# Patient Record
Sex: FEMALE | Race: WHITE | Employment: OTHER | URBAN - METROPOLITAN AREA
[De-identification: names, ages, dates, MRNs, and addresses within clinical notes are randomized per-mention and may not be internally consistent; named-entity substitution may affect disease eponyms.]

---

## 2018-06-26 ENCOUNTER — HOSPITAL ENCOUNTER (EMERGENCY)
Facility: CLINIC | Age: 43
Discharge: HOME | End: 2018-06-26
Attending: EMERGENCY MEDICINE | Admitting: EMERGENCY MEDICINE
Payer: COMMERCIAL

## 2018-06-26 VITALS
SYSTOLIC BLOOD PRESSURE: 128 MMHG | BODY MASS INDEX: 36.1 KG/M2 | TEMPERATURE: 98 F | OXYGEN SATURATION: 99 % | WEIGHT: 230 LBS | HEART RATE: 78 BPM | DIASTOLIC BLOOD PRESSURE: 82 MMHG | HEIGHT: 67 IN | RESPIRATION RATE: 16 BRPM

## 2018-06-26 DIAGNOSIS — H81.399 PERIPHERAL VERTIGO, UNSPECIFIED LATERALITY: ICD-10-CM

## 2018-06-26 PROCEDURE — 93005 ELECTROCARDIOGRAM TRACING: CPT

## 2018-06-26 PROCEDURE — 99283 EMERGENCY DEPT VISIT LOW MDM: CPT

## 2018-06-26 PROCEDURE — 25000131 ZZH RX MED GY IP 250 OP 636 PS 637: Performed by: NURSE PRACTITIONER

## 2018-06-26 RX ORDER — MECLIZINE HYDROCHLORIDE 25 MG/1
25 TABLET ORAL ONCE
Status: COMPLETED | OUTPATIENT
Start: 2018-06-26 | End: 2018-06-26

## 2018-06-26 RX ORDER — METHYLPREDNISOLONE 4 MG
TABLET, DOSE PACK ORAL
Qty: 21 TABLET | Refills: 0 | Status: SHIPPED | OUTPATIENT
Start: 2018-06-26 | End: 2020-03-10

## 2018-06-26 RX ORDER — OXYMETAZOLINE HYDROCHLORIDE 0.05 G/100ML
2 SPRAY NASAL 2 TIMES DAILY
Qty: 1 BOTTLE | Refills: 0 | Status: SHIPPED | OUTPATIENT
Start: 2018-06-26 | End: 2018-06-29

## 2018-06-26 RX ADMIN — MECLIZINE HYDROCHLORIDE 25 MG: 25 TABLET ORAL at 17:45

## 2018-06-26 ASSESSMENT — ENCOUNTER SYMPTOMS
FATIGUE: 1
FEVER: 0
HEADACHES: 1
PALPITATIONS: 0
COUGH: 1
SHORTNESS OF BREATH: 0
DIZZINESS: 1

## 2018-06-26 NOTE — ED AVS SNAPSHOT
[facility] Emergency Department    [address]    [facility] [address]    Phone:  [phone]    Fax:  [phone]                                       Yolanda Gilmore   MRN: [id number]    Department:  [facility] Emergency Department   Date of Visit:  6/26/2018           After Visit Summary Signature Page     I have received my discharge instructions, and my questions have been answered. I have discussed any challenges I see with this plan with the nurse or doctor.    ..........................................................................................................................................  Patient/Patient Representative Signature      ..........................................................................................................................................  Patient Representative Print Name and Relationship to Patient    ..................................................               ................................................  Date                                            Time    ..........................................................................................................................................  Reviewed by Signature/Title    ...................................................              ..............................................  Date                                                            Time

## 2018-06-26 NOTE — ED TRIAGE NOTES
Pt presents with episodes of dizziness since Friday. Onset of room spinning, followed by tingling in body and nausea. Episodes last a couple hours. Has had daily episode since Friday. Also complains of sinus congestion, and itchy ears. Reports onset of HA last night. Pain level  4/10.

## 2018-06-26 NOTE — ED PROVIDER NOTES
History     Chief Complaint:  Dizziness    The history is provided by the patient.      Yolanda Gilmore is a 42 year old female who presents for evaluation of dizziness. The patient reports onset of room-spinning dizziness 4 days ago which comes and goes in episodes with associated nausea. These episodes last 1-2 hours, are exacerbated with movement such as standing up or walking around, and are somewhat alleviated by sitting down and concentrating on her breathing. Patient additionally notes onset of fatigue and mild cough in parallel with onset of her dizziness. Patient has no history of this. The patient does endorse small episodes of dizziness after standing up quickly in the last few weeks, lasting approximately 5-10 seconds.    Of note, the patient also is experiencing frontal and maxillary sinus pressure, nasal congestion and mild, non-productive cough. The patient states that she typically wears glasses and stopped wearing them in the las 2-3 weeks. Patient takes prescribed medication for anxiety, depression and hypertension. She states she occasionally forgets to take these medications.  She did take all prescribed medications the last three days.  Last dose of any medication was Excedrin last night. The patient declines offer of pain medication. Patient denies posterior headaches, fevers, shortness of breath, chest pain, palpitations, or other complaint.     Allergies:  No known drug allergies     Medications:    Bupropion  Hydrochlorothiazide    Past Medical History:    Major depression  Tobacco user  Obesity  Chemical dependence  Sleep apnea  Kidney stone    Past Surgical History:    History reviewed. No pertinent surgical history.     Family History:    Alcoholism  CVD  CAD  Diabetes  Ovarian cancer    Social History:  Presents alone   Tobacco use: current every day somker  Alcohol use: yes (socially)  PCP: Physician No Ref-Primary    Marital Status:  Single     Review of Systems   Constitutional:  "Positive for fatigue. Negative for fever.   Respiratory: Positive for cough. Negative for shortness of breath.    Cardiovascular: Negative for chest pain and palpitations.   Neurological: Positive for dizziness and headaches.     Physical Exam     Patient Vitals for the past 24 hrs:   BP Temp Temp src Heart Rate Resp SpO2 Height Weight   06/26/18 1713 (!) 130/97 98  F (36.7  C) Oral 67 20 100 % 1.702 m (5' 7\") 104.3 kg (230 lb)      Physical Exam  Constitutional: Alert, attentive, GCS 15.    HENT: Mucous membranes are moist. Ears: TMs gray, translucent, with serous effusion. External canals normal. Pain with tapping over right maxillary and frontal sinuses.   Eyes: EOM are normal. PERRLA. Conjunctiva pink, no scleral icterus or conjunctival injection  CV: regular rate and rhythm; no murmurs, rubs or gallups.  Radial, dorsalis pedis and posterior tibial pulses 2+ bilaterally.  Cap refill <2 seconds.  Respiratory: Effort normal. Lungs clear to auscultation bilaterally. No crackles/rubs/wheezes.  Good air movement.  GI:  There is no tenderness; rebound or guarding. No distension. Normal bowel sounds.  MSK: Normal range of motion. No peripheral edema or calf tenderness.  Neurological:Alert and oriented to person/place/time.  Intact recent and remote memory, judgment and insight, normal mood and affect. no aphasia/facial droop/dysarthria, tongue midline, symmetric palatal elevation, normal strength at SCM/trapezius/BUE/BLE, normal coordination to FNF at BUE, no pronator drift, sensation intact to LT over face/BUE/BLE, normal causal gait.  Skin: Skin is warm and dry.  No rashes or petechiae.  Psychiatric: Normal affect.    Emergency Department Course   ECG (17:13:58):  Rate 65 bpm. RI interval 118. QRS duration 94. QT/QTc 408/424. P-R-T axes 61 64 62. Christine sinus rhythm. Possible left atrial enlargement. Borderline ECG. Agree with computer interpretation. No significant change when compared to EKG dated 04/20/2013.  " Interpreted at 1806 by Loreta Trevizo MD.     Interventions:  1745: Meclizine 25 mg PO      Emergency Department Course:  Past medical records, nursing notes, and vitals reviewed.  1710: I performed an exam of the patient and obtained history, as documented above.    Above interventions provided.  EKG was taken here in the ED, results as above.    1835: I rechecked the patient. Findings and plan explained to the Patient. Patient discharged home with instructions regarding supportive care, medications, and reasons to return. The importance of close follow-up was reviewed.      Impression & Plan      Medical Decision Making:  Yolanda Gilmore is a 42 year old female who presents for evaluation of vertigo. She received dose of meclizine here without recurrence of vertigo. The differential diagnosis of vertigo is broad and includes common etiologies such as menieres disease, labyrinthitis, benign positional vertigo, otitis media, etc.  More serious etiologies considered include central etiologies such as tumor, intracerebral bleed, dissection, ischemic cerebral vascular accident.  The history, physical exam including detailed neurologic exam, and workup in the emergency room suggests a benign cause of vertigo today. No indication for advanced imaging at this point (CT/MRI) as there are no definite signs of a central and concerning etiology for the vertigo.   I suspect patient's vertigo related to vestibular neuritis.  She will be sent home with prescription for Medrol dose pack.   She was also given prescription for Afrin to help decrease congestion.  Further outpatient management is indicated with vertigo medications.  Discussed expected course of illness.  Patient will follow-up with ENT if not signficantly improved in one week.  Vertigo precautions given for home.  Return with worsening symptoms including headache, one-sided weakness, difficulty speaking, fevers, or any further concerns.     Diagnosis:     ICD-10-CM    1. Peripheral vertigo, unspecified laterality H81.399        Disposition:  Discharged to home with plan as outlined.     Discharge Medications:  Discharge Medication List as of 6/26/2018  6:46 PM      START taking these medications    Details   methylPREDNISolone (MEDROL DOSEPAK) 4 MG tablet Follow package instructions, Disp-21 tablet, R-0, Local Print      oxymetazoline (AFRIN NASAL SPRAY) 0.05 % spray Spray 2 sprays in nostril 2 times daily for 3 days, Disp-1 Bottle, R-0, Local PrintNo drip 12 hour formula please               YSABEL, Fabiano Hernandez, daquan serving as a scribe at 5:22 PM on 6/26/2018 to document services personally performed by Loreta Trevizo MD based on my observations and the provider's statements to me.    6/26/2018   [facility] EMERGENCY DEPARTMENT     Bri Mccullough, RUBINA CNP  06/26/18 7875

## 2018-06-26 NOTE — ED AVS SNAPSHOT
[facility] Emergency Department    [address]    [facility] [address]    Phone:  [phone]    Fax:  [phone]                                       Yolanda Gilmore   MRN: [id number]    Department:  [facility] Emergency Department   Date of Visit:  6/26/2018           Patient Information     Date Of Birth          1975        Your diagnoses for this visit were:     Peripheral vertigo, unspecified laterality        You were seen by Loreta Trevizo MD.      Follow-up Information     Follow up with [facility] In 1 week.    Why:  As needed    Contact information:    [address] [facility] [phone]        Follow up with [facility] Emergency Department.    Specialty:  EMERGENCY MEDICINE    Why:  If symptoms worsen    Contact information:    [address]  [facility] [address]  [phone]      Discharge References/Attachments     VERTIGO, UNSPECIFIED (ENGLISH)      24 Hour Appointment Hotline       To make an appointment at any [facility] clinic, call [phone] ([phone]). If you don't have a family doctor or clinic, we will help you find one. [facility] clinics are conveniently located to serve the needs of you and your family.             Review of your medicines      START taking        Dose / Directions Last dose taken    methylPREDNISolone 4 MG tablet   Commonly known as:  MEDROL DOSEPAK   Quantity:  21 tablet        Follow package instructions   Refills:  0        oxymetazoline 0.05 % spray   Commonly known as:  AFRIN NASAL SPRAY   Dose:  2 spray   Quantity:  1 Bottle        Spray 2 sprays in nostril 2 times daily for 3 days   Refills:  0          Our records show that you are taking the medicines listed below. If these are incorrect, please call your family doctor or clinic.        Dose / Directions Last dose taken    BUPROPION HCL PO        Refills:  0        HYDROCHLOROTHIAZIDE  PO        Refills:  0                Prescriptions were sent or printed at these locations (2 Prescriptions)                   Other Prescriptions                Printed at Department/Unit printer (2 of 2)         methylPREDNISolone (MEDROL DOSEPAK) 4 MG tablet               oxymetazoline (AFRIN NASAL SPRAY) 0.05 % spray                Procedures and tests performed during your visit     EKG 12 lead      Orders Needing Specimen Collection     None      Pending Results     Date and Time Order Name Status Description    6/26/2018 1709 EKG 12 lead Preliminary             Pending Culture Results     No orders found from 6/24/2018 to 6/27/2018.            Pending Results Instructions     If you had any lab results that were not finalized at the time of your Discharge, you can call the ED Lab Result RN at [phone]. You will be contacted by this team for any positive Lab results or changes in treatment. The nurses are available 7 days a week from 10A to 6:30P.  You can leave a message 24 hours per day and they will return your call.        Test Results From Your Hospital Stay               Clinical Quality Measure: Blood Pressure Screening     Your blood pressure was checked while you were in the emergency department today. The last reading we obtained was  BP: 122/87 . Please read the guidelines below about what these numbers mean and what you should do about them.  If your systolic blood pressure (the top number) is less than 120 and your diastolic blood pressure (the bottom number) is less than 80, then your blood pressure is normal. There is nothing more that you need to do about it.  If your systolic blood pressure (the top number) is 120-139 or your diastolic blood pressure (the bottom number) is 80-89, your blood pressure may be higher than it should be. You should have your blood pressure rechecked within a year by a primary care provider.  If your systolic blood pressure (the top number) is 140 or greater or  "your diastolic blood pressure (the bottom number) is 90 or greater, you may have high blood pressure. High blood pressure is treatable, but if left untreated over time it can put you at risk for heart attack, stroke, or kidney failure. You should have your blood pressure rechecked by a primary care provider within the next 4 weeks.  If your provider in the emergency department today gave you specific instructions to follow-up with your doctor or provider even sooner than that, you should follow that instruction and not wait for up to 4 weeks for your follow-up visit.        Thank you for choosing [facility]       Thank you for choosing [facility] for your care. Our goal is always to provide you with excellent care. Hearing back from our patients is one way we can continue to improve our services. Please take a few minutes to complete the written survey that you may receive in the mail after you visit with us. Thank you!        [employer] Information     [employer] lets you send messages to your doctor, view your test results, renew your prescriptions, schedule appointments and more. To sign up, go to [email or web address] . Click on \"Log in\" on the left side of the screen, which will take you to the Welcome page. Then click on \"Sign up Now\" on the right side of the page.     You will be asked to enter the access code listed below, as well as some personal information. Please follow the directions to create your username and password.     Your access code is: [email or web address]  Expires: 2018  6:46 PM     Your access code will  in 90 days. If you need help or a new code, please call your [facility] clinic or [phone].        Care EveryWhere ID     This is your Care EveryWhere ID. This could be used by other organizations to access your [facility] medical records  [id number]        Equal Access to Services     ROBBIN KRUGER AH: perfecto Floyd, conner cole " margarita torres ah. So [facility] [phone].    ATENCIÓN: Si habla español, tiene a perez disposición servicios gratuitos de asistencia lingüística. Llame al [phone].    We comply with applicable federal civil rights laws and [place] laws. We do not discriminate on the basis of race, color, national origin, age, disability, sex, sexual orientation, or gender identity.            After Visit Summary       This is your record. Keep this with you and show to your community pharmacist(s) and doctor(s) at your next visit.

## 2018-06-27 LAB — INTERPRETATION ECG - MUSE: NORMAL

## 2020-01-26 ENCOUNTER — OFFICE VISIT (OUTPATIENT)
Dept: URGENT CARE | Facility: URGENT CARE | Age: 45
End: 2020-01-26
Payer: COMMERCIAL

## 2020-01-26 VITALS
DIASTOLIC BLOOD PRESSURE: 90 MMHG | WEIGHT: 217 LBS | TEMPERATURE: 98.9 F | BODY MASS INDEX: 32.89 KG/M2 | HEIGHT: 68 IN | HEART RATE: 80 BPM | SYSTOLIC BLOOD PRESSURE: 126 MMHG | RESPIRATION RATE: 12 BRPM

## 2020-01-26 DIAGNOSIS — B96.89 BV (BACTERIAL VAGINOSIS): ICD-10-CM

## 2020-01-26 DIAGNOSIS — N89.8 VAGINAL DISCHARGE: ICD-10-CM

## 2020-01-26 DIAGNOSIS — N76.0 BV (BACTERIAL VAGINOSIS): ICD-10-CM

## 2020-01-26 DIAGNOSIS — N39.0 URINARY TRACT INFECTION WITHOUT HEMATURIA, SITE UNSPECIFIED: Primary | ICD-10-CM

## 2020-01-26 DIAGNOSIS — R30.0 DYSURIA: ICD-10-CM

## 2020-01-26 LAB
ALBUMIN UR-MCNC: ABNORMAL MG/DL
APPEARANCE UR: CLEAR
BILIRUB UR QL STRIP: NEGATIVE
COLOR UR AUTO: YELLOW
GLUCOSE UR STRIP-MCNC: NEGATIVE MG/DL
HGB UR QL STRIP: ABNORMAL
KETONES UR STRIP-MCNC: NEGATIVE MG/DL
LEUKOCYTE ESTERASE UR QL STRIP: ABNORMAL
NITRATE UR QL: NEGATIVE
PH UR STRIP: 7 PH (ref 5–7)
RBC #/AREA URNS AUTO: ABNORMAL /HPF
SOURCE: ABNORMAL
SP GR UR STRIP: 1.02 (ref 1–1.03)
SPECIMEN SOURCE: ABNORMAL
UROBILINOGEN UR STRIP-ACNC: 0.2 EU/DL (ref 0.2–1)
WBC #/AREA URNS AUTO: ABNORMAL /HPF
WET PREP SPEC: ABNORMAL

## 2020-01-26 PROCEDURE — 87210 SMEAR WET MOUNT SALINE/INK: CPT | Performed by: FAMILY MEDICINE

## 2020-01-26 PROCEDURE — 81001 URINALYSIS AUTO W/SCOPE: CPT | Performed by: FAMILY MEDICINE

## 2020-01-26 PROCEDURE — 87591 N.GONORRHOEAE DNA AMP PROB: CPT | Performed by: FAMILY MEDICINE

## 2020-01-26 PROCEDURE — 99203 OFFICE O/P NEW LOW 30 MIN: CPT | Performed by: FAMILY MEDICINE

## 2020-01-26 PROCEDURE — 87491 CHLMYD TRACH DNA AMP PROBE: CPT | Performed by: FAMILY MEDICINE

## 2020-01-26 PROCEDURE — 87086 URINE CULTURE/COLONY COUNT: CPT | Performed by: FAMILY MEDICINE

## 2020-01-26 RX ORDER — SULFAMETHOXAZOLE/TRIMETHOPRIM 800-160 MG
1 TABLET ORAL 2 TIMES DAILY
Qty: 5 TABLET | Refills: 0 | Status: SHIPPED | OUTPATIENT
Start: 2020-01-26 | End: 2020-03-10

## 2020-01-26 ASSESSMENT — MIFFLIN-ST. JEOR: SCORE: 1682.81

## 2020-01-27 LAB
BACTERIA SPEC CULT: NORMAL
SPECIMEN SOURCE: NORMAL

## 2020-01-27 NOTE — PROGRESS NOTES
"SUBJECTIVE:   Yolanda Gilmore is a 44 year old female who  presents today for a possible UTI. Symptoms of dysuria and frequency have been going on for 1week(s).  Hematuria no.  sudden onset and worseningand moderate.  There is no history of fever, chills, nausea or vomiting.  No history of vaginal \discharge. This patient does not have a history of urinary tract infections. Patient would like STD screen, endorse vaginal odor.    Past Medical History:   Diagnosis Date     Anxiety      Depressive disorder      Kidney stone      Current Outpatient Medications   Medication Sig Dispense Refill     BUPROPION HCL PO        HYDROCHLOROTHIAZIDE PO        sulfamethoxazole-trimethoprim (BACTRIM DS/SEPTRA DS) 800-160 MG tablet Take 1 tablet by mouth 2 times daily 5 tablet 0     VENLAFAXINE HCL PO        methylPREDNISolone (MEDROL DOSEPAK) 4 MG tablet Follow package instructions 21 tablet 0     Social History     Tobacco Use     Smoking status: Current Every Day Smoker     Smokeless tobacco: Never Used   Substance Use Topics     Alcohol use: Yes     Comment: socially        ROS:   Review of systems negative except as stated above.    OBJECTIVE:  BP (!) 126/90   Pulse 80   Temp 98.9  F (37.2  C) (Oral)   Resp 12   Ht 1.727 m (5' 8\")   Wt 98.4 kg (217 lb)   LMP 07/26/2019   Breastfeeding No   BMI 32.99 kg/m    GENERAL APPEARANCE: healthy, alert and no distress  PSYCH: mentation appears normal and affect normal/bright    Results for orders placed or performed in visit on 01/26/20   *UA reflex to Microscopic and Culture ([place] and [facility] (except [place] and [place])     Status: Abnormal   Result Value Ref Range    Color Urine Yellow     Appearance Urine Clear     Glucose Urine Negative NEG^Negative mg/dL    Bilirubin Urine Negative NEG^Negative    Ketones Urine Negative NEG^Negative mg/dL    Specific Gravity Urine 1.025 1.003 - 1.035    Blood Urine Trace (A) NEG^Negative    pH Urine 7.0 5.0 - 7.0 pH    " Protein Albumin Urine Trace (A) NEG^Negative mg/dL    Urobilinogen Urine 0.2 0.2 - 1.0 EU/dL    Nitrite Urine Negative NEG^Negative    Leukocyte Esterase Urine Trace (A) NEG^Negative    Source Midstream Urine    Urine Microscopic     Status: Abnormal   Result Value Ref Range    WBC Urine 10-25 (A) OTO5^0 - 5 /HPF    RBC Urine 2-5 (A) OTO2^O - 2 /HPF   Urine Culture Aerobic Bacterial     Status: None   Result Value Ref Range    Specimen Description Midstream Urine     Culture Micro       <10,000 colonies/mL  mixed urogenital [place]  Susceptibility testing not routinely done     Wet prep     Status: Abnormal   Result Value Ref Range    Specimen Description Vagina     Wet Prep Few  Clue cells seen   (A)     Wet Prep No yeast seen     Wet Prep No Trichomonas seen     Wet Prep No WBC's seen        ASSESSMENT/PLAN:   (N39.0) Urinary tract infection without hematuria, site unspecified  (primary encounter diagnosis)  Plan: Urine Culture Aerobic Bacterial,         sulfamethoxazole-trimethoprim (BACTRIM         DS/SEPTRA DS) 800-160 MG tablet            (R30.0) Dysuria  Comment: UTI  Plan: *UA reflex to Microscopic and Culture ([place]         and [place] [facility] ([place] and         [place]), Urine Microscopic, Urine Culture         Aerobic Bacterial            (N89.8) Vaginal discharge  Comment: BV  Plan: Wet prep, Neisseria gonorrhoeae PCR, Chlamydia         trachomatis PCR            (N76.0,  B96.89) BV (bacterial vaginosis)  Plan: metrogel (has already)    Empiric treatment for presumptive UTI with RX Bactrim.  Will follow up on urine culture and adjust medication if needed.  Drink plenty of fluids.  Prevention and treatment of UTI's discussed.Signs and symptoms of pyelonephritis mentioned.    Reviewed wet prep and BV result, patient has metrogel from prior episode and has not used.  Discussed frequent recurrence with BV and does not have to treat at this time if not causing a lot of symptoms.  Reviewed that can  not drink alcohol if uses metronidazole.  Will follow up on STD screen and treat if positive.    Follow up with primary provider if no resolution of symptoms in 1 week    Derik Pratt MD

## 2020-01-28 LAB
C TRACH DNA SPEC QL NAA+PROBE: NEGATIVE
N GONORRHOEA DNA SPEC QL NAA+PROBE: NEGATIVE
SPECIMEN SOURCE: NORMAL
SPECIMEN SOURCE: NORMAL

## 2020-03-10 ENCOUNTER — OFFICE VISIT (OUTPATIENT)
Dept: URGENT CARE | Facility: URGENT CARE | Age: 45
End: 2020-03-10
Payer: COMMERCIAL

## 2020-03-10 VITALS
DIASTOLIC BLOOD PRESSURE: 78 MMHG | HEIGHT: 68 IN | HEART RATE: 86 BPM | OXYGEN SATURATION: 99 % | BODY MASS INDEX: 34.86 KG/M2 | WEIGHT: 230 LBS | TEMPERATURE: 98.1 F | SYSTOLIC BLOOD PRESSURE: 122 MMHG
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
## 2020-03-10 DIAGNOSIS — S71.152A CAT BITE OF LEFT THIGH, INITIAL ENCOUNTER: Primary | ICD-10-CM

## 2020-03-10 PROCEDURE — 90471 IMMUNIZATION ADMIN: CPT | Performed by: INTERNAL MEDICINE

## 2020-03-10 PROCEDURE — 99213 OFFICE O/P EST LOW 20 MIN: CPT | Mod: 25 | Performed by: INTERNAL MEDICINE

## 2020-03-10 PROCEDURE — 90715 TDAP VACCINE 7 YRS/> IM: CPT | Performed by: INTERNAL MEDICINE

## 2020-03-10 ASSESSMENT — MIFFLIN-ST. JEOR: SCORE: 1741.77

## 2020-03-10 ASSESSMENT — ENCOUNTER SYMPTOMS: FEVER: 0

## 2020-03-11 NOTE — PROGRESS NOTES
"SUBJECTIVE:   Yolanda Gilmore is a 44 year old female presenting with a chief complaint of   Chief Complaint   Patient presents with     Cat Bite     two nights ago left upper thigh by friends cat, last Td 2009 per [facility]      Urgent Care       She is an established patient of [facility].        Onset of symptoms was 2 day(s) ago.    Location: Left upper thigh  Context: Bitten by her friend's cat/has had cat for 3 months  Unsure about shots  Current and Associated symptoms: pain and bruising.  Treatment measures tried include: antibiotic ointment, elevate & washed  Relief from treatment: yes    Paranoid that could be serious      Review of Systems   Constitutional: Negative for fever.       Past Medical History:   Diagnosis Date     Anxiety      Depressive disorder      Kidney stone      Family History   Problem Relation Age of Onset     Chemical Addiction Father         etoh     Cerebrovascular Disease Father      Coronary Artery Disease Father      Diabetes Father      Diabetes Mother      Ovarian Cancer Mother      Current Outpatient Medications   Medication Sig Dispense Refill     BUPROPION HCL PO        VENLAFAXINE HCL PO        HYDROCHLOROTHIAZIDE PO        Social History     Tobacco Use     Smoking status: Current Every Day Smoker     Smokeless tobacco: Never Used   Substance Use Topics     Alcohol use: Yes     Comment: socially        OBJECTIVE  /78 (BP Location: Left arm, Patient Position: Chair, Cuff Size: Adult Large)   Pulse 86   Temp 98.1  F (36.7  C) (Oral)   Ht 1.727 m (5' 8\")   Wt 104.3 kg (230 lb)   SpO2 99%   BMI 34.97 kg/m      Physical Exam  Vitals signs reviewed.   Constitutional:       Appearance: Normal appearance.   Skin:     Findings: Bruising present.      Comments: With 4 puncture marks   Neurological:      Mental Status: She is alert.                 Labs:  No results found for this or any previous visit (from the past 24 hour(s)).        ASSESSMENT:      ICD-10-CM    1. Cat " bite of left thigh, initial encounter  S71.152A     W55.01XA         Medical Decision Making:  Cat needs to be observed for 10 days.    Currently doesn't appear infected  Discussed signs of infection  No abx given.    Patient Instructions         Patient Education     Cat Bite    A cat bite can cause a wound deep enough to break the skin. In such cases, the wound is cleaned and then sometimes closed. If the wound is closed it is usually not closed completely. This is so that fluid can drain if the wound becomes infected. Often the wound is left open to heal. In addition to wound care, a tetanus shot may be given, if needed.  Home care    Wash your hands well with soap and warm water before and after caring for the wound. This helps lower the risk of infection.    Care for the wound as directed. If a dressing was applied to the wound, be sure to change it as directed.    If the wound bleeds, place a clean, soft cloth on the wound. Then firmly apply pressure until the bleeding stops. This may take up to 5 minutes. Don't release the pressure and look at the wound during this time.    Always get medical attention for cat bites on the hand. They are highly likely to become infected.    Most wounds heal within 10 days. But an infection can occur even with proper treatment. So be sure to check the wound daily for signs of infection (see below).    Antibiotics may be prescribed. These help prevent or treat infection. If you re given antibiotics, take them as directed. Also be sure to complete the medicines.  Rabies prevention  Rabies is a virus that can be carried in certain animals. These can include domestic animals such as cats and dogs. Pets fully vaccinated against rabies (2 shots) are at very low risk of infection. But because human rabies is almost always fatal, any biting pet should be confined for 10 days as an extra precaution. In general, if there is a risk for rabies, the following steps may need to be  taken:    If someone s pet cat has bitten you, it should be kept in a secure area for the next 10 days to watch for signs of illness. If the pet owner won t allow this, contact your local animal control center. If the cat becomes ill or dies during that time, contact your local animal control center at once so the animal may be tested for rabies. If the cat stays healthy for the next 10 days, there is no danger of rabies in the animal or you.    If a stray cat bit you, contact your local animal control center. They can give information on capture, quarantine, and animal rabies testing.    If you can t find the animal that bit you in the next 2 days, and if rabies exists in your area, you may need to receive the rabies vaccine series. Call your healthcare provider right away. Or return to the emergency department promptly.    All animal bites should be reported to the local animal control center. If you were not given a form to fill out, you can report this yourself.  Follow-up care  Follow up with your healthcare provider, or as directed.  When to seek medical advice  Call your healthcare provider right away if any of these occur:    Signs of infection:  ? Spreading redness or warmth from the wound  ? Increased pain or swelling  ? Fever of 100.4 F (38 C) or higher, or as directed by your healthcare provider  ? Colored fluid or pus draining from the wound  ? Enlarged lymph nodes above the area that was bitten, such as lymph nodes in the armpit if you were bitten on the hand or arm. This may be a sign of cat-scratch disease (cat-scratch fever).    Signs of rabies infection:  ? Headache  ? Confusion  ? Strange behavior  ? Increased salivating or drooling  ? Seizure    Decreased ability to move any body part near the bite area    Bleeding that can't be stopped after 5 minutes of firm pressure  Date Last Reviewed: 5/1/2018 2000-2019 [employer]. [address] [place] [address], [place] [address]. All rights  reserved. This information is not intended as a substitute for professional medical care. Always follow your healthcare professional's instructions.

## 2020-03-11 NOTE — PATIENT INSTRUCTIONS
Patient Education     Cat Bite    A cat bite can cause a wound deep enough to break the skin. In such cases, the wound is cleaned and then sometimes closed. If the wound is closed it is usually not closed completely. This is so that fluid can drain if the wound becomes infected. Often the wound is left open to heal. In addition to wound care, a tetanus shot may be given, if needed.  Home care    Wash your hands well with soap and warm water before and after caring for the wound. This helps lower the risk of infection.    Care for the wound as directed. If a dressing was applied to the wound, be sure to change it as directed.    If the wound bleeds, place a clean, soft cloth on the wound. Then firmly apply pressure until the bleeding stops. This may take up to 5 minutes. Don't release the pressure and look at the wound during this time.    Always get medical attention for cat bites on the hand. They are highly likely to become infected.    Most wounds heal within 10 days. But an infection can occur even with proper treatment. So be sure to check the wound daily for signs of infection (see below).    Antibiotics may be prescribed. These help prevent or treat infection. If you re given antibiotics, take them as directed. Also be sure to complete the medicines.  Rabies prevention  Rabies is a virus that can be carried in certain animals. These can include domestic animals such as cats and dogs. Pets fully vaccinated against rabies (2 shots) are at very low risk of infection. But because human rabies is almost always fatal, any biting pet should be confined for 10 days as an extra precaution. In general, if there is a risk for rabies, the following steps may need to be taken:    If someone s pet cat has bitten you, it should be kept in a secure area for the next 10 days to watch for signs of illness. If the pet owner won t allow this, contact your local animal control center. If the cat becomes ill or dies during  that time, contact your local animal control center at once so the animal may be tested for rabies. If the cat stays healthy for the next 10 days, there is no danger of rabies in the animal or you.    If a stray cat bit you, contact your local animal control center. They can give information on capture, quarantine, and animal rabies testing.    If you can t find the animal that bit you in the next 2 days, and if rabies exists in your area, you may need to receive the rabies vaccine series. Call your healthcare provider right away. Or return to the emergency department promptly.    All animal bites should be reported to the local animal control center. If you were not given a form to fill out, you can report this yourself.  Follow-up care  Follow up with your healthcare provider, or as directed.  When to seek medical advice  Call your healthcare provider right away if any of these occur:    Signs of infection:  ? Spreading redness or warmth from the wound  ? Increased pain or swelling  ? Fever of 100.4 F (38 C) or higher, or as directed by your healthcare provider  ? Colored fluid or pus draining from the wound  ? Enlarged lymph nodes above the area that was bitten, such as lymph nodes in the armpit if you were bitten on the hand or arm. This may be a sign of cat-scratch disease (cat-scratch fever).    Signs of rabies infection:  ? Headache  ? Confusion  ? Strange behavior  ? Increased salivating or drooling  ? Seizure    Decreased ability to move any body part near the bite area    Bleeding that can't be stopped after 5 minutes of firm pressure  Date Last Reviewed: 5/1/2018 2000-2019 [employer]. [address] [place] [address], [place] [address]. All rights reserved. This information is not intended as a substitute for professional medical care. Always follow your healthcare professional's instructions.

## 2020-03-11 NOTE — NURSING NOTE
"Yolanda Gilmore;   Chief Complaint   Patient presents with     Cat Bite     two nights ago left upper thigh by friends cat, last Td 2009 per [facility]      Urgent Care     Initial /78 (BP Location: Left arm, Patient Position: Chair, Cuff Size: Adult Large)   Pulse 86   Temp 98.1  F (36.7  C) (Oral)   Ht 1.727 m (5' 8\")   Wt 104.3 kg (230 lb)   SpO2 99%   BMI 34.97 kg/m   Estimated body mass index is 34.97 kg/m  as calculated from the following:    Height as of this encounter: 1.727 m (5' 8\").    Weight as of this encounter: 104.3 kg (230 lb)..  BP completed using cuff size large.  Veronika Smalls, Registered Nurse   [facility]   "